# Patient Record
Sex: MALE | Race: WHITE | NOT HISPANIC OR LATINO | Employment: FULL TIME | URBAN - METROPOLITAN AREA
[De-identification: names, ages, dates, MRNs, and addresses within clinical notes are randomized per-mention and may not be internally consistent; named-entity substitution may affect disease eponyms.]

---

## 2023-06-12 ENCOUNTER — OFFICE VISIT (OUTPATIENT)
Dept: ORTHOPEDIC SURGERY | Facility: CLINIC | Age: 48
End: 2023-06-12
Payer: COMMERCIAL

## 2023-06-12 VITALS
SYSTOLIC BLOOD PRESSURE: 134 MMHG | WEIGHT: 170 LBS | DIASTOLIC BLOOD PRESSURE: 86 MMHG | BODY MASS INDEX: 22.53 KG/M2 | HEIGHT: 73 IN

## 2023-06-12 DIAGNOSIS — M79.672 LEFT FOOT PAIN: Primary | ICD-10-CM

## 2023-06-12 DIAGNOSIS — M79.672 PAIN OF LEFT HEEL: ICD-10-CM

## 2023-06-12 RX ORDER — FLUTICASONE PROPIONATE 50 MCG
SPRAY, SUSPENSION (ML) NASAL
COMMUNITY

## 2023-06-12 RX ORDER — GUAIFENESIN 600 MG/1
TABLET, EXTENDED RELEASE ORAL
COMMUNITY

## 2023-06-12 RX ORDER — MULTIPLE VITAMINS W/ MINERALS TAB 9MG-400MCG
TAB ORAL
COMMUNITY

## 2023-06-12 NOTE — PATIENT INSTRUCTIONS
Plantar Fasciitis  What is the plantar fascia?  The plantar fascia is a web of strong bands of fibrous connective tissue beneath the skin on the bottom of the foot  This tissue travels along the arch of the foot, from the base of the heel bone (calcaneus) to the ball of the foot (the metatarsophalangeal joints)      The plantar fascia functions to support the arch of the foot, especially during the forefoot push-off phase of the gait cycle    What is plantar fasciitis?  Plantar fasciitis is the most common cause of heel pain   The pain is typically experienced at the bottom of the heel and sometimes along the arch of the foot  Other less typical pain patterns have been observed  Pain is often worsened by physical activity and improved with rest  Pain can be pronounced during the first few steps in the morning or during the first few steps after a period of rest        What causes plantar fasciitis?  Plantar fasciitis is most accurately characterized as a degenerative condition  The term degenerative is used to describe conditions that are caused by repetitive “wear and tear”   For the plantar fascia, these small “wear and tear” injuries tend to accumulate near where the connective tissue band attaches to the heel bone  The body will naturally try to heal this damaged tissue and this may produce some swelling and inflammation localized to the bottom of the heel area  The plantar fascia tissue is constantly under some degree of tension and this tension is increased with weight-bearing and high impact activities  Changes in activity level and inadequate or poorly supportive footwear may play a role in the development of plantar fasciitis although some cases develop spontaneously without an obvious explanation   Some patients with heel pain from plantar fasciitis may have bone spurs on their x-ray. Bone spurs do not cause plantar fasciitis. Many people without heel pain and no diagnosis of plantar fasciitis will have  bone spurs were they to have x-rays performed.       What is the treatment for plantar fasciitis?  Plantar fasciitis rarely needs surgery  The vast majority of patients recover normal painless function through a combination of the below nonsurgical treatment strategies  The most important thing to recognize is that it takes time to recover and that the most successful patients are those that apply the below strategies consistently over time    Nonsurgical treatment options  Temporary immobilization:  Some patients present with a plantar fascia that is very acutely inflamed and exquisitely tender to the touch. It is difficult for these patients to walk and they do so with a limp. These patients often benefit from a period of stricter rest, such as in a walking boot, to jumpstart the healing process and calm down the aggravated tissue. For these cases a boot or cast may be recommended.    Temporary activity modifications:   Avoid high impact activities such as power walking, running, using the treadmill, hiking and jumping. These activities place a lot of strain on the plantar fascia and can impede its ability to heal. Continuation of these activities through the treatment or recovery period may prolong the duration of symptoms.     Stretching exercises:  Healing of the plantar fascia tissue is dependent upon the flexibility of the calf muscle. If the calf is tight, this leads to greater tension along the plantar fascia tissue during walking and can obstruct healing. There is good evidence that a consistent calf stretching program can improve symptoms of plantar fasciitis and lead to recovery. Improving calf and Achilles tendon flexibility does not happen overnight, however. It can take 6-12 weeks of routine stretching before improvements are realized. Stretching and massage techniques along the plantar fascia in the arch of the foot can also be tried with ice and rollers.     Night splinting:  Many patients share  "that pain is the worst in the mornings with the first few steps of the day. This is probably due to the way the ankle rests in plantarflexion (toes pointed down) during sleep. A plantarflexed ankle leads to a contracted or tight position for the calf and plantar fascia tissue. There is some evidence to suggest that keeping the ankle in a neutral (90-degree) position overnight can help alleviate some of the morning symptoms. This can be accomplished with a night splint. These can be searched for and purchased on [employer] for $20-40.     Go to [employer].com and type in \"Plantar Fasciitis Night Splint\"    Orthotics:  In general, a well cushioned soft orthotic or insert will be better tolerated when the bottom of the heel is sore. It is recommended that this orthotic or insert also include some form of arch support. Gel heel cups can also be tried to further cushion the painful heel area.         Footwear modifications:  Patients prefer shoes with a thicker and more rigid supportive sole. Shoes with a rocker-bottom style sole are helpful because the foot rolls with the shoe on the walking surface. This type of sole limits the need for the forefoot to push-off and this decreases the demands and strain on the plantar fascia tissue. Many footwear companies offer this style now. [employer]® is one such brand.         Injections:  Injections are typically reserved for patients with symptoms that are not responding to the above measures. Steroid is one type of injection that is effective for pain control but usually temporary. On occasion patients may expect 3-6 months of relief. Symptoms will likely return, however, if this treatment is performed in isolation. It is not advisable to perform these often as there are risks such as heel fat pad atrophy. Other injections are also available such as platelet-rich-plasma (PRP). This is still a relatively new treatment technique, but some early evidence suggests that it may be more effective " than steroid.     Anti-inflammatory medications:  Non-steroidal anti-inflammatory medications (NSAIDs) may be tried. There are over-the-counter and prescription options. These are typically taken by mouth, but there are also topical formulations. Plantar fasciitis is not considered an inflammatory condition and so the use of these medications in isolation will rarely solve the underlying problem. Rather, these medications can help with pain while other, more effective strategies like stretching, are employed. Some patients have medical conditions that limit their ability to take NSAIDs.       Other treatments:  Extracorporeal shock wave therapy (ESWT), radiofrequency ablation (RFA), iontophoresis and the Tenex procedure are alternative second-line treatments.     Surgical treatment options  Nonsurgical treatment has a very high rate of success for plantar fasciitis. It is rare that a patient needs surgery, and this is usually not considered until around 12 months of failed conservative treatment.   The surgical treatment for plantar fasciitis is called a plantar fasciotomy. This is basically a partial surgical release of the damaged plantar fascia band near the attachment on the heel bone. This can be done with open or endoscopic (scope) technique. With the open technique there is a single few centimeter incision on the inner aspect of the heel. With the scope technique there are two very small poke-hole incisions on either side of the heel. If there is a bone spur on the bottom of the heel bone, this is usually removed at the time of surgery.   Occasionally, a calf or heel cord lengthening procedure may be recommended at the same time as the plantar fasciotomy surgery. This is basically the surgical way to stretch the calf. This procedure is called a gastrocnemius recession.     Plantar Fasciitis Rehabilitation Stretches/Exercises:     YOU MUST DO:   5 REPETITIONS          6-8 TIMES PER DAY            FOR 4  MONTHS     Cross the injured leg over other leg. While placing fingers along base of toes, pull toes back toward shin until stretch felt in plantar fascia.           Stand with the ball of the injured foot on a stair. Reach for the bottom step with your heel until stretching felt in arch of foot. Hold this position for 30-60 seconds then relax and repeat.        Standing calf stretch - While facing a wall, put your hands against the wall at eye level. Keep injured leg back and uninjured leg forward while keeping the heel of the injured leg on the floor. Turn injured foot slightly inward while slowly leaning inward until you feel the back of your calf on the injured leg stretch. Repeat 3 times.        Towel Stretch - Sit on a hard surface with your injured leg stretched out. Wrap a towel around the foot of the stretched out leg and pull the towel toward your body, stretching the back of the calf for 30 seconds. Repeat 3 times.        Frozen can roll - Roll bare injured foot back and forth from heel to mid-arch over a frozen juice can. Repeat for 3-5 minutes. This exercise is particularly useful in the morning.        Sitting toe raise - Sit in a chair with your feet flat on the floor. Raise the toes and ball of injured foot while keeping heel on the floor. Hold for 5 seconds, repeat 10 times and perform 3 sets of 10.        Towel pickup - With your heel on the ground,  a towel with your toes and release. Repeat 10-20 times.    Resources:  Eder BF, Fouzia DA, Elías DP, Dee PA, Richie TT, Dori GE, Ankit JF. Plantar fascia-specific stretching exercise improves outcomes in patients with chronic plantar fasciitis. A prospective clinical trial with two-year follow-up. J Bone Joint Surg Am. 2006 Aug;88(8):1775-81.  Kitty CJ, James D, Ordonez BP. Correlation Between Gastrocnemius Tightness and Heel Pain Severity in Plantar Fasciitis. Foot Ankle Int. 2021 Jan;42(1):76-82.  Some images were obtained  from the American Academy of Orthopedic Surgery's patient education resource called OrthoInfo. Additional information can be found at www.orthoinfo.org. Search “plantar fasciitis”.   Jagjit TURNER, Napoleon FLORES (1999) Plantar fasciitis: etiology and treatment. Journal of Orthopaedic & Sports Physical Therapy 29, 776-462  GABO Sykes Special Foot Exercises [Internet]. [facility][1]. Available from [email or web address]

## 2023-06-12 NOTE — PROGRESS NOTES
[facility] [place]     Office Visit       Date: 06/12/2023   Patient Name: Rizwan Waters  MRN: [id number]  YOB: 1975    Referring Physician: Zakia Holden APRN     Chief Complaint:   Chief Complaint   Patient presents with    Left Foot - Pain       History of Present Illness: Rizwan Waters is a 48 y.o. male who is here today left heel pain.  Has been going on since March 2023.  States stretching and icing does help with his symptoms.  Activities such as walking and running exacerbate symptoms.  Pain described as dull aching and burning.  No symptoms on the right.  Works as .  Does not smoke.  States played tennis last night and and today his symptoms are not too bad.  Symptoms typically worse after running.  Thinks that tennis is not too bad for his symptoms because he is mainly on the balls of his feet while playing.  Has not done formal physical therapy or tried any other treatment.  Does occasionally wear orthotics however unsure if they help with symptoms.    Subjective   Review of Systems: Review of Systems   Constitutional:  Positive for activity change. Negative for chills, fever, unexpected weight gain and unexpected weight loss.   HENT:  Positive for sinus pressure. Negative for congestion, postnasal drip and rhinorrhea.    Eyes:  Negative for blurred vision.   Respiratory:  Negative for shortness of breath.    Cardiovascular:  Negative for leg swelling.   Gastrointestinal:  Negative for abdominal pain, nausea and vomiting.   Genitourinary:  Negative for difficulty urinating.   Musculoskeletal:  Positive for arthralgias. Negative for gait problem, joint swelling and myalgias.   Skin:  Negative for skin lesions and wound.   Allergic/Immunologic: Positive for environmental allergies.   Neurological:  Negative for dizziness, weakness, light-headedness and numbness.   Hematological:  Does not bruise/bleed easily.  "  Psychiatric/Behavioral:  Negative for depressed mood.    All other systems reviewed and are negative.     Past Medical History:   Past Medical History:   Diagnosis Date    Dislocation, shoulder 11/2020    Lumbosacral disc disease 2011       Past Surgical History: History reviewed. No pertinent surgical history.    Family History: History reviewed. No pertinent family history.    Social History:   Social History     Socioeconomic History    Marital status:    Tobacco Use    Smoking status: Never    Smokeless tobacco: Never   Substance and Sexual Activity    Alcohol use: Yes     Alcohol/week: 5.0 standard drinks     Types: 5 Cans of beer per week    Drug use: Never    Sexual activity: Yes     Partners: Female       Medications:   Current Outpatient Medications:     fluticasone (FLONASE) 50 MCG/ACT nasal spray, , Disp: , Rfl:     guaiFENesin (MUCINEX) 600 MG 12 hr tablet, , Disp: , Rfl:     multivitamin with minerals tablet tablet, multivitamin, Disp: , Rfl:     Allergies: No Known Allergies    I reviewed the patient's chief complaint, history of present illness, review of systems, past medical history, surgical history, family history, social history, medications and allergy list.     Objective    Vital Signs:   Vitals:    06/12/23 1022   BP: 134/86   Weight: 77.1 kg (170 lb)   Height: 185.4 cm (73\")     Body mass index is 22.43 kg/m².    Ortho Exam:  left LE Foot and Ankle Exam:   Non antalgic gait pattern. Hindfoot alignment is neutral. Plantigrade foot.  Slight cavus.  Neurological exam of the superficial peroneal, deep peroneal, plantar, sural and saphenous nerves demonstrates intact sensation and normal motor function.   There is good perfusion to the toes.   The skin is intact throughout the foot and ankle without ulceration.   Range of motion of ankle, subtalar joint, midfoot and toes is within normal limits.   There is tenderness to palpation at the plantar aspect of the posteromedial heel at the " "plantar fascia insertion site. Positive Silfverskiold test.      Results Review:   Imaging Results (Last 24 Hours)       Procedure Component Value Units Date/Time    XR Foot 3+ View Left [[id number]] Resulted: 06/12/23 1031     Updated: 06/12/23 1034    Narrative:      Left Foot X-Ray 06/12/23   Indication: Pain  Views: 3 weight bearing , comparison none  Findings: xrays reviewed by me today in the office and show, No fracture,   No bony lesion, Normal soft tissues, Normal joint spaces             Assessment / Plan    Assessment/Plan:   Diagnoses and all orders for this visit:    1. Left foot pain (Primary)  -     XR Foot 3+ View Left    2. Pain of left heel      Discussed heel pain/injury (undiagnosed new problem with uncertain prognosis) with patient as well as treatment options at length. Decision regarding surgical intervention considered. Patient is not a candidate due to trial of nonoperative management. I explained to the the patient that we will treat this as plantar fasciitis. I explained the treatments that are statistically significant at improving the problem include stretching/night splinting and casting.    I explained the most important treatments: Stretching and night splinting. I provided them with an educational handout and showed them the stretches and they were able to reproduce them. I emphasized the \"toe pull\" as the most important stretch. They need to do the stretches ~5 repetitions each, 6-8 times per day.  I explained how to do and also provided them with information regarding getting a night splint.    If they do not improve after 4 months of aggressive stretching and night splinting, the next step is typically a short leg fiberglass walking cast worn for 6 weeks.    Discussed with patient that most patients with this condition improve with nonoperative treatment however it frequently takes at least several months. I did explain to patient that if conservative management fails there are " surgical treatment options however they are not 100% successful.        Follow Up:   Return if symptoms worsen or fail to improve.      Isidoro Marie MD  [facility]